# Patient Record
Sex: FEMALE | Race: WHITE | NOT HISPANIC OR LATINO | Employment: FULL TIME | URBAN - METROPOLITAN AREA
[De-identification: names, ages, dates, MRNs, and addresses within clinical notes are randomized per-mention and may not be internally consistent; named-entity substitution may affect disease eponyms.]

---

## 2017-01-11 ENCOUNTER — APPOINTMENT (OUTPATIENT)
Dept: WOMENS IMAGING | Facility: HOSPITAL | Age: 52
End: 2017-01-11

## 2017-01-11 ENCOUNTER — PROCEDURE VISIT (OUTPATIENT)
Dept: OBSTETRICS AND GYNECOLOGY | Facility: CLINIC | Age: 52
End: 2017-01-11

## 2017-01-11 DIAGNOSIS — Z12.31 VISIT FOR SCREENING MAMMOGRAM: Primary | ICD-10-CM

## 2017-01-11 PROCEDURE — 77067 SCR MAMMO BI INCL CAD: CPT | Performed by: OBSTETRICS & GYNECOLOGY

## 2017-01-11 PROCEDURE — 77067 SCR MAMMO BI INCL CAD: CPT | Performed by: RADIOLOGY

## 2017-12-14 ENCOUNTER — OFFICE VISIT (OUTPATIENT)
Dept: OBSTETRICS AND GYNECOLOGY | Facility: CLINIC | Age: 52
End: 2017-12-14

## 2017-12-14 VITALS
WEIGHT: 145 LBS | DIASTOLIC BLOOD PRESSURE: 66 MMHG | HEIGHT: 67 IN | BODY MASS INDEX: 22.76 KG/M2 | SYSTOLIC BLOOD PRESSURE: 101 MMHG | HEART RATE: 66 BPM

## 2017-12-14 DIAGNOSIS — Z01.419 WELL WOMAN EXAM WITH ROUTINE GYNECOLOGICAL EXAM: Primary | ICD-10-CM

## 2017-12-14 PROBLEM — Q89.2 THYROGLOSSAL DUCT CYST: Status: ACTIVE | Noted: 2017-04-12

## 2017-12-14 PROBLEM — C73 CANCER OF THYROID GLAND (HCC): Status: ACTIVE | Noted: 2017-04-12

## 2017-12-14 PROCEDURE — 99396 PREV VISIT EST AGE 40-64: CPT | Performed by: OBSTETRICS & GYNECOLOGY

## 2017-12-14 RX ORDER — VALACYCLOVIR HYDROCHLORIDE 500 MG/1
TABLET, FILM COATED ORAL
Refills: 0 | COMMUNITY
Start: 2017-11-20 | End: 2017-12-14 | Stop reason: SDUPTHER

## 2017-12-14 RX ORDER — VALACYCLOVIR HYDROCHLORIDE 500 MG/1
500 TABLET, FILM COATED ORAL DAILY
Qty: 30 TABLET | Refills: 11 | Status: SHIPPED | OUTPATIENT
Start: 2017-12-14 | End: 2019-06-18 | Stop reason: SDUPTHER

## 2017-12-14 RX ORDER — LEVOTHYROXINE SODIUM 125 MCG
TABLET ORAL
Refills: 2 | COMMUNITY
Start: 2017-11-20

## 2017-12-14 NOTE — PROGRESS NOTES
Subjective   Aneta Bhat is a 52 y.o. female  Patient here for annual. LAST PAP- 7/31/15.. LAST MAMMO- 17    History of Present Illness   Aneta Bhat is a 52 y.o.  who presents for an annual examination   Takes Valtrex - last outbreak 9 months ago   Enrolled in Ovarian cancer screening - ultrasound was in 2017 normal    Pap history:  Last pap:  - NIL, HR HPV + 16/18 Negative  Prior abnormal paps: yes, see above   DXA:  yes, by PCP  Colonoscopy:  yes, 3 years ago  Mammogram: 2017  STDs  Sexually active: no  History of STDs: no  Menopause:  Age: ablation -  - hot flashes now  Bleeding since? no  Vasomotor symptoms: yes, better with thyroid medicine  HRT: no - plans to start testosterone on Monday ([facility]) - sees Dr. Collins PCP for hormones  Incontinence?  no  Dyspareunia: no    History of physical abuse: no, History of sexual abuse: no and History of verbal/emotional abuse: no    BRCA screening:  Is patient's family or personal history significant for any of the following? yes, but declines genetic screening  For non-Ashkenazi [place] women:   Two first-degree relatives with breast cancer, one of whom was diagnosed at age 50 or younger   A combination of three or more first or second-degree relatives with breast cancer regardless of age at diagnosis   A combination of both breast and ovarian cancer among first and second-degree relatives   A first-degree relative with bilateral breast cancer   A combination of two or more first or second degree relatives with ovarian cancer, regardless of age at diagnosis   A first or second-degree relative with both breast and ovarian cancer at any age   History of breast cancer in a male relative   For women of Ashkenazi [place] descent:   Any first-degree relative (or two second degree relatives on the same side of the family) with breast or ovarian cancer   Recommendations from the [place] Preventive Services Task Force on who  should be offered genetic testing for BRCA mutations*     Past Medical History:   Diagnosis Date   • Arthritis    • Low back pain    • Menopause     TAKING VENLAFAXINE   • Neuropathy     FINGERTIPS   • PONV (postoperative nausea and vomiting)     WANTS SCOPOLAMINE PATCH   • Restless leg    • Thyroid cancer 2017   • Torn rotator cuff     RIGHT     Past Surgical History:   Procedure Laterality Date   • BLEPHAROPLASTY     • ENDOMETRIAL ABLATION  1999   • SHOULDER ARTHROSCOPY W/ ROTATOR CUFF REPAIR Right 11/17/2016    Procedure: RT SHOULDER ARTHROSCOPY WITH ACROMIOPLASTY AND ROTATOR CUFF REPAIR;  Surgeon: Mor Terry MD;  Location: [facility] OR [facility];  Service:      Social History   Substance Use Topics   • Smoking status: Never Smoker   • Smokeless tobacco: Never Used   • Alcohol use Yes      Comment: social     Family History   Problem Relation Age of Onset   • Uterine cancer Mother      70s   • Anemia Mother    • Hypertension Mother    • COPD Mother    • Depression Mother    • Osteoporosis Mother    • Colon cancer Father      80s   • Hypertension Father    • Osteoporosis Father    • Breast cancer Maternal Aunt      70s   • Breast cancer Paternal Aunt      40s   • Ovarian cancer Maternal Aunt      50s     Current Outpatient Prescriptions on File Prior to Visit   Medication Sig Dispense Refill   • [DISCONTINUED] Omega-3 Fatty Acids (FISH OIL) 1000 MG capsule capsule Take 2,000 mg by mouth Daily. PT TO HOLD MED PRIOR TO OR     • [DISCONTINUED] oxyCODONE-acetaminophen (PERCOCET) 5-325 MG per tablet Take 2 tablets by mouth Every 4 (Four) Hours As Needed (Pain). 40 tablet 0   • [DISCONTINUED] venlafaxine XR (EFFEXOR-XR) 37.5 MG 24 hr capsule Take 1 capsule by mouth daily. 30 capsule 11   • [DISCONTINUED] vitamin E 400 UNIT capsule Take 400 Units by mouth Daily. PT TO HOLD MED PRIOR TO OR       No current facility-administered medications on file prior to visit.      Allergies   Allergen Reactions   • Penicillins Hives,  "Swelling, Anaphylaxis and Shortness Of Breath        Review of Systems   Constitutional: Negative for chills, fever and unexpected weight change.   HENT: Negative for congestion, nosebleeds and sore throat.    Eyes: Negative for visual disturbance.   Respiratory: Negative for cough, chest tightness and shortness of breath.    Cardiovascular: Negative for chest pain and palpitations.   Gastrointestinal: Negative for abdominal pain, constipation, diarrhea, nausea and vomiting.   Endocrine: Negative for polyuria.   Genitourinary: Negative for difficulty urinating, dyspareunia, dysuria, frequency, genital sores, hematuria, menstrual problem, pelvic pain, urgency, vaginal bleeding, vaginal discharge and vaginal pain.   Musculoskeletal: Negative for arthralgias and joint swelling.   Skin: Negative for color change and rash.   Neurological: Negative for dizziness, light-headedness and headaches.   Hematological: Does not bruise/bleed easily.   Psychiatric/Behavioral: Negative for dysphoric mood. The patient is not nervous/anxious.        Objective    /66  Pulse 66  Ht 170.2 cm (67.01\")  Wt 65.8 kg (145 lb)  BMI 22.7 kg/m2   Physical Exam   Constitutional: She is oriented to person, place, and time. She appears well-developed and well-nourished. No distress.   HENT:   Head: Normocephalic and atraumatic.   Neck: No tracheal deviation present. No thyromegaly present.   Cardiovascular: Normal rate, regular rhythm and normal heart sounds.  Exam reveals no gallop and no friction rub.    No murmur heard.  Pulmonary/Chest: Effort normal and breath sounds normal. No respiratory distress. She has no wheezes. She has no rales. She exhibits no tenderness. Right breast exhibits no inverted nipple, no mass, no nipple discharge, no skin change and no tenderness. Left breast exhibits no inverted nipple, no mass, no nipple discharge, no skin change and no tenderness.   Abdominal: Soft. She exhibits no distension and no mass. " There is no tenderness.   Genitourinary: Uterus normal. No labial fusion. There is no rash, lesion or injury on the right labia. There is no rash, lesion or injury on the left labia. Uterus is not deviated, not enlarged, not fixed and not tender. Cervix exhibits no motion tenderness, no discharge and no friability. Right adnexum displays no mass, no tenderness and no fullness. Left adnexum displays no mass, no tenderness and no fullness. No tenderness or bleeding in the vagina. No vaginal discharge found.   Musculoskeletal: Normal range of motion. She exhibits no edema or deformity.   Lymphadenopathy:     She has no cervical adenopathy.   Neurological: She is alert and oriented to person, place, and time.   Skin: Skin is warm and dry. No rash noted. She is not diaphoretic.   Psychiatric: She has a normal mood and affect. Her behavior is normal. Judgment and thought content normal.         Assessment/Plan    Well woman counseling/screening:    Cervical cancer screening:    Reports h/o cervical dysplasia - HPV positive on most recent pap smear    The patient is due for a pap today with HPV testing.    Screening guidelines discussed with patient  Breast cancer screening:    Mammogram completed Jan 2017, plans to start doing with annual examination   Breast self awareness encouraged  Colonscopy:   UTD  DXA   Did with PCP  Family history    does demonstrate need for genetics referral given aunt with breast and ovarian cancer. Patient has a lot of health issues going on right now and would like to defer this but is understanding of the risks/benefits of BRCA and BRCA testing. She will contact us when she wishes for a genetic referral.  Healthy lifestyle counseling:   Body mass index is 22.7 kg/(m^2).     HRT: with PCP was referred to [facility]. Plans to follow up with them for initiation of testosterone.            Aneta was seen today for gynecologic exam.    Diagnoses and all orders for this  visit:    Well woman exam with routine gynecological exam  -     IGP, Aptima HPV, Rfx 16 / 18,45 - ThinPrep Vial, Cervix    Other orders  -     valACYclovir (VALTREX) 500 MG tablet; Take 1 tablet by mouth Daily.

## 2017-12-19 ENCOUNTER — TELEPHONE (OUTPATIENT)
Dept: OBSTETRICS AND GYNECOLOGY | Facility: CLINIC | Age: 52
End: 2017-12-19

## 2017-12-19 LAB
CYTOLOGIST CVX/VAG CYTO: NORMAL
CYTOLOGY CVX/VAG DOC THIN PREP: NORMAL
DX ICD CODE: NORMAL
HIV 1 & 2 AB SER-IMP: NORMAL
HPV I/H RISK 4 DNA CVX QL PROBE+SIG AMP: NEGATIVE
Lab: NORMAL
OTHER STN SPEC: NORMAL
PATH REPORT.FINAL DX SPEC: NORMAL
STAT OF ADQ CVX/VAG CYTO-IMP: NORMAL

## 2017-12-19 NOTE — TELEPHONE ENCOUNTER
----- Message from Maribel Anand MD sent at 12/19/2017  3:44 PM EST -----  Please call patient and let her know that her pap smear was normal and her HPV testing was negative.  For patients without a history of abnormal pap smears, we recommend repeat pap smears every five years and an annual exam every year unless otherwise indicated at the appointment or other issues arise.  Thanks!

## 2018-12-20 ENCOUNTER — TELEPHONE (OUTPATIENT)
Dept: OBSTETRICS AND GYNECOLOGY | Facility: CLINIC | Age: 53
End: 2018-12-20

## 2018-12-20 ENCOUNTER — OFFICE VISIT (OUTPATIENT)
Dept: OBSTETRICS AND GYNECOLOGY | Facility: CLINIC | Age: 53
End: 2018-12-20

## 2018-12-20 VITALS
HEART RATE: 64 BPM | BODY MASS INDEX: 24.17 KG/M2 | DIASTOLIC BLOOD PRESSURE: 74 MMHG | SYSTOLIC BLOOD PRESSURE: 118 MMHG | HEIGHT: 67 IN | WEIGHT: 154 LBS

## 2018-12-20 DIAGNOSIS — Z01.419 WELL WOMAN EXAM WITH ROUTINE GYNECOLOGICAL EXAM: Primary | ICD-10-CM

## 2018-12-20 PROCEDURE — 99396 PREV VISIT EST AGE 40-64: CPT | Performed by: OBSTETRICS & GYNECOLOGY

## 2018-12-20 RX ORDER — ESTRADIOL 0.04 MG/D
1 PATCH TRANSDERMAL WEEKLY
Qty: 4 PATCH | Refills: 3 | Status: SHIPPED | OUTPATIENT
Start: 2018-12-20 | End: 2019-03-18 | Stop reason: SDUPTHER

## 2018-12-20 NOTE — PROGRESS NOTES
Chief Complaint   Patient presents with   • Annual Exam     last pap: 17. NL no HPV detected. last mammo: 17. last colonoscopy: . DEXA:          Aneta Bhat is a 53 y.o.  who presents for an annual examination     Pap history:  Last pap: Dec 2017  NIL, HPV negative  Prior abnormal paps: yes, HPV positive 16/18negative - NIL in 201  DXA:  yes,  - reports was normal  Colonoscopy:  Yes  due in 10 years  Mammogram: due  STDs  Sexually active: yes  History of STDs: no  Menopause:  Age: 4 years ago started having hot flashes.  Had an ablation and stopped having periods.  Her VMS are bad enough where she is considering hormone replacement therapy. She tried testosterone compounding last year without relief.  Denies any h/o cholesterol disorders or abnormalities.  She denies any h/o stroke, VTE  Bleeding since? no  Vasomotor symptoms: yes  HRT: no  Dyspareunia: yes    Is patient's family or personal history significant for any risks for BRCA? yes, not interested in genetic testing at this time    Past Medical History:   Diagnosis Date   • Arthritis    • Low back pain    • Menopause     TAKING VENLAFAXINE   • Neuropathy     FINGERTIPS   • PONV (postoperative nausea and vomiting)     WANTS SCOPOLAMINE PATCH   • Restless leg    • Thyroid cancer (CMS/HCC) 2017   • Torn rotator cuff     RIGHT     Past Surgical History:   Procedure Laterality Date   • BLEPHAROPLASTY     • ENDOMETRIAL ABLATION     • SHOULDER ARTHROSCOPY W/ ROTATOR CUFF REPAIR Right 2016    Procedure: RT SHOULDER ARTHROSCOPY WITH ACROMIOPLASTY AND ROTATOR CUFF REPAIR;  Surgeon: Mor Terry MD;  Location: [facility] OR [facility] [place];  Service:      OB History    Para Term  AB Living   2 1 1   1 1   SAB TAB Ectopic Molar Multiple Live Births                    # Outcome Date GA Lbr [place]/2nd Weight Sex Delivery Anes PTL Lv   2 Term 93     Vag-Spont      1 AB                 Social History     Tobacco Use   •  Smoking status: Never Smoker   • Smokeless tobacco: Never Used   Substance Use Topics   • Alcohol use: Yes     Comment: social   • Drug use: No     Family History   Problem Relation Age of Onset   • Uterine cancer Mother         70s   • Anemia Mother    • Hypertension Mother    • COPD Mother    • Depression Mother    • Osteoporosis Mother    • Colon cancer Father         80s   • Hypertension Father    • Osteoporosis Father    • Breast cancer Maternal Aunt         70s   • Breast cancer Paternal Aunt         40s   • Ovarian cancer Maternal Aunt         50s     Current Outpatient Medications on File Prior to Visit   Medication Sig Dispense Refill   • LINACLOTIDE PO Take  by mouth As Needed.     • SYNTHROID 125 MCG tablet TAKE 1 TABLET BY MOUTH EVERY DAY  2   • valACYclovir (VALTREX) 500 MG tablet Take 1 tablet by mouth Daily. (Patient taking differently: Take 500 mg by mouth As Needed.) 30 tablet 11     No current facility-administered medications on file prior to visit.      Allergies   Allergen Reactions   • Penicillins Hives, Swelling, Anaphylaxis and Shortness Of Breath        Review of Systems   Constitutional: Negative for chills, fever and unexpected weight change.   HENT: Negative for congestion, nosebleeds and sore throat.    Eyes: Negative for visual disturbance.   Respiratory: Negative for cough, chest tightness and shortness of breath.    Cardiovascular: Negative for chest pain and palpitations.   Gastrointestinal: Negative for abdominal pain, constipation, diarrhea, nausea and vomiting.   Endocrine: Negative for polyuria.   Genitourinary: Negative for difficulty urinating, dyspareunia, dysuria, frequency, genital sores, hematuria, menstrual problem, pelvic pain, urgency, vaginal bleeding, vaginal discharge and vaginal pain.   Musculoskeletal: Negative for arthralgias and joint swelling.   Skin: Negative for color change and rash.   Neurological: Negative for dizziness, light-headedness and headaches.  "  Hematological: Does not bruise/bleed easily.   Psychiatric/Behavioral: Negative for dysphoric mood. The patient is not nervous/anxious.        OBJECTIVE:   Vitals:    12/20/18 1059   BP: 118/74   Pulse: 64   Weight: 69.9 kg (154 lb)   Height: 170.2 cm (67\")      Physical Exam   Constitutional: She is oriented to person, place, and time. She appears well-developed and well-nourished. No distress.   HENT:   Head: Normocephalic and atraumatic.   Eyes: EOM are normal. No scleral icterus.   Neck: Normal range of motion. Neck supple. No thyromegaly present.   Cardiovascular: Normal rate and regular rhythm. Exam reveals no gallop and no friction rub.   No murmur heard.  Pulmonary/Chest: Effort normal and breath sounds normal. No respiratory distress. She has no wheezes. She has no rales. She exhibits no tenderness. Right breast exhibits no inverted nipple. Left breast exhibits no inverted nipple. Breasts are symmetrical. There is no breast swelling.   Abdominal: Soft. Bowel sounds are normal. She exhibits no distension. There is no tenderness. There is no guarding.   Genitourinary: Vagina normal and uterus normal. There is no rash, tenderness, lesion, injury or Bartholin's cyst on the right labia. There is no rash, tenderness, lesion, injury or Bartholin's cyst on the left labia. Uterus is not mobile.   Cervix is not parous. Cervix does not exhibit motion tenderness, discharge, friability, lesion, polyp, nabothian cyst, eversion, pinkness or cyanosis. Right adnexum displays no mass, no tenderness and no fullness. Right adnexum is present.Left adnexum displays no mass, no tenderness and no fullness. Left adnexum is present.No vaginal discharge found.   Musculoskeletal: She exhibits no edema or deformity.   Neurological: She is alert and oriented to person, place, and time.   Skin: Skin is warm and dry. She is not diaphoretic.   Psychiatric: She has a normal mood and affect. Her speech is normal and behavior is normal. " Judgment and thought content normal. Cognition and memory are normal.       ASSESSMENT/PLAN:  Well woman counseling/screening:    Cervical cancer screening:    Reports some h/o cervical dysplasia   The patient is due for a pap 2020.    Screening guidelines discussed with patient  Breast cancer screening:    Mammogram scheduled   Breast self awareness encouraged  Colonscopy:   Due in 2024  DXA   2004, recommended calcium and vitamin d, weight bearing exercise  Family history  Reviewed criteria for genetic referral  Healthy lifestyle counseling:   return for routine annual checkups   Patient was counseled on the risks and benefits of HRT  Specifically, we reviewed the FDA indications for HRT in postmenopausal women for the treatment of vasomotor symptoms.  The appropriate, often lowest, effective dose of systemic ET consistent with treatment goals that provides benefits and minimizes risks for the individual woman should be the therapeutic goal. In women who have a uterus, they should take progestin to prevent development of endometrial hyperplasia/malignancy. She is understanding that HRT can be associated with some risks including but not limited to nausea, bloating, mood swings, bleeding, headaches, breast tenderness as well as more serious side effects such as increased risk of CVD, stroke, hypertension and rare risk of breast cancer.  Relative contraindications do exist for hormone therapy and include unexplained vaginal bleeding, severe active liver disease, prior estrogen-sensitive breast or endometrial cancer, coronary heart disease (CHD), stroke, dementia, personal history or inherited high risk of thromboembolic disease, porphyria cutanea tarda, and hypertriglyc-eridemia.  There are also concerns that in women with a history of endometriosis, HT could result in a reactivation.  Also, migraine headaches may worsen, or leiomyomas may grow.  In a model of joint decision making, the patient has decided to  proceed with low dose estrogen patch and progestin pills after discussing with her endocrinologist.  If she were to have unexplained vaginal bleeding, swelling in leg, chest pain, or other concerning finding, she should stop the HT and seek immediate medical care.    She was counseled that we may have to adjust the dosing to treat the symptoms with an ultimate plan to taper as tolerated.  All questions were answered to apparent satisfaction and she will call back after discussion with her endocrinologist.

## 2018-12-20 NOTE — PATIENT INSTRUCTIONS
We were considering starting a low dose estrogen patch and progestin (oral) therapy for vasomotor symptoms of menopause.  Please discuss with endocrinologist

## 2018-12-20 NOTE — TELEPHONE ENCOUNTER
Pt called after speaking with her endocrinologist and she said he was in agreement with the hormone patch and the pill. Pt is asking for this to be sent to her pharmacy. Please advise.       Pt callback: [phone]

## 2018-12-21 ENCOUNTER — PROCEDURE VISIT (OUTPATIENT)
Dept: OBSTETRICS AND GYNECOLOGY | Facility: CLINIC | Age: 53
End: 2018-12-21

## 2018-12-21 ENCOUNTER — APPOINTMENT (OUTPATIENT)
Dept: WOMENS IMAGING | Facility: HOSPITAL | Age: 53
End: 2018-12-21

## 2018-12-21 DIAGNOSIS — Z12.31 VISIT FOR SCREENING MAMMOGRAM: Primary | ICD-10-CM

## 2018-12-21 PROCEDURE — 77067 SCR MAMMO BI INCL CAD: CPT | Performed by: OBSTETRICS & GYNECOLOGY

## 2018-12-21 PROCEDURE — 77067 SCR MAMMO BI INCL CAD: CPT | Performed by: RADIOLOGY

## 2018-12-26 ENCOUNTER — TELEPHONE (OUTPATIENT)
Dept: OBSTETRICS AND GYNECOLOGY | Facility: CLINIC | Age: 53
End: 2018-12-26

## 2018-12-26 NOTE — TELEPHONE ENCOUNTER
"Please let patient know that mammogram was normal this year.  It is recommended that we inform all patients of their breast density; her breast density is \"extremely dense\" which lowers the sensitivity of mammogram.  A good reference to explain this further is: https://www.cancer.gov/types/breast/breast-changes/dense-breasts.     "

## 2019-03-18 RX ORDER — ESTRADIOL 0.04 MG/D
1 PATCH TRANSDERMAL WEEKLY
Qty: 12 PATCH | Refills: 2 | Status: SHIPPED | OUTPATIENT
Start: 2019-03-18 | End: 2019-06-20 | Stop reason: SDUPTHER

## 2019-06-18 RX ORDER — VALACYCLOVIR HYDROCHLORIDE 500 MG/1
500 TABLET, FILM COATED ORAL DAILY
Qty: 30 TABLET | Refills: 1 | Status: SHIPPED | OUTPATIENT
Start: 2019-06-18 | End: 2019-08-30 | Stop reason: SDUPTHER

## 2019-06-20 ENCOUNTER — OFFICE VISIT (OUTPATIENT)
Dept: OBSTETRICS AND GYNECOLOGY | Facility: CLINIC | Age: 54
End: 2019-06-20

## 2019-06-20 VITALS
WEIGHT: 147 LBS | DIASTOLIC BLOOD PRESSURE: 76 MMHG | HEIGHT: 67 IN | BODY MASS INDEX: 23.07 KG/M2 | HEART RATE: 69 BPM | SYSTOLIC BLOOD PRESSURE: 114 MMHG

## 2019-06-20 DIAGNOSIS — R35.0 URINARY FREQUENCY: Primary | ICD-10-CM

## 2019-06-20 DIAGNOSIS — N95.1 MENOPAUSAL VASOMOTOR SYNDROME: ICD-10-CM

## 2019-06-20 LAB
BILIRUB BLD-MCNC: NEGATIVE MG/DL
CLARITY, POC: CLEAR
COLOR UR: YELLOW
GLUCOSE UR STRIP-MCNC: NEGATIVE MG/DL
KETONES UR QL: ABNORMAL
LEUKOCYTE EST, POC: NEGATIVE
NITRITE UR-MCNC: NEGATIVE MG/ML
PH UR: 6 [PH] (ref 5–8)
PROT UR STRIP-MCNC: NEGATIVE MG/DL
RBC # UR STRIP: NEGATIVE /UL
SP GR UR: 1.02 (ref 1–1.03)
UROBILINOGEN UR QL: NORMAL

## 2019-06-20 PROCEDURE — 99213 OFFICE O/P EST LOW 20 MIN: CPT | Performed by: OBSTETRICS & GYNECOLOGY

## 2019-06-20 PROCEDURE — 81002 URINALYSIS NONAUTO W/O SCOPE: CPT | Performed by: OBSTETRICS & GYNECOLOGY

## 2019-06-20 RX ORDER — ESTRADIOL 0.04 MG/D
1 PATCH TRANSDERMAL WEEKLY
Qty: 12 PATCH | Refills: 4 | Status: SHIPPED | OUTPATIENT
Start: 2019-06-20 | End: 2020-07-31 | Stop reason: SDUPTHER

## 2019-06-20 NOTE — PROGRESS NOTES
SUBJECTIVE:   Chief Complaint   Patient presents with   • hormones     pt is following from hormone patch. Pt also reports the urgency of urinating and does not urinate.         Aneta Bhat is a 54 y.o.  who presents for follow up of HRT.  Reports hot flashes are much improved.  She is also sleeping well.  She has not had any vaginal bleeding.  She is taking the estrogen patch and progestin by mouth.  She does report urinary frequency with small amounts of urination.  Upon completion she does feel that her bladder is completely empty.  She has not had any fever or chills.  She has had symptoms like this before during a urinary tract infection.  This started about a month ago and feels that it has overall improved by increasing fluid intake and eating yogurt.  Denies any vaginal discharge or irritation    Past Medical History:   Diagnosis Date   • Arthritis    • Low back pain    • Menopause     TAKING VENLAFAXINE   • Neuropathy     FINGERTIPS   • PONV (postoperative nausea and vomiting)     WANTS SCOPOLAMINE PATCH   • Restless leg    • Thyroid cancer (CMS/[facility]) 2017   • Torn rotator cuff     RIGHT     Past Surgical History:   Procedure Laterality Date   • BLEPHAROPLASTY     • ENDOMETRIAL ABLATION     • SHOULDER ARTHROSCOPY W/ ROTATOR CUFF REPAIR Right 2016    Procedure: RT SHOULDER ARTHROSCOPY WITH ACROMIOPLASTY AND ROTATOR CUFF REPAIR;  Surgeon: Mor Terry MD;  Location: [facility] OR [facility] [place];  Service:      OB History    Para Term  AB Living   2 1 1   1 1   SAB TAB Ectopic Molar Multiple Live Births                    # Outcome Date GA Lbr [place]/2nd Weight Sex Delivery Anes PTL Lv   2 Term 93     Vag-Spont      1 AB                  Social History     Tobacco Use   • Smoking status: Never Smoker   • Smokeless tobacco: Never Used   Substance Use Topics   • Alcohol use: Yes     Comment: social   • Drug use: No     Family History   Problem Relation Age of Onset   • Uterine cancer  "Mother         70s   • Anemia Mother    • Hypertension Mother    • COPD Mother    • Depression Mother    • Osteoporosis Mother    • Colon cancer Father         80s   • Hypertension Father    • Osteoporosis Father    • Breast cancer Maternal Aunt         70s   • Breast cancer Paternal Aunt         40s   • Ovarian cancer Maternal Aunt         50s     Current Outpatient Medications on File Prior to Visit   Medication Sig Dispense Refill   • LINACLOTIDE PO Take  by mouth As Needed.     • SYNTHROID 125 MCG tablet TAKE 1 TABLET BY MOUTH EVERY DAY  2   • valACYclovir (VALTREX) 500 MG tablet Take 1 tablet by mouth Daily. 30 tablet 1   • [DISCONTINUED] estradiol (CLIMARA) 0.0375 MG/24HR Place 1 patch on the skin as directed by provider 1 (One) Time Per Week. 12 patch 2   • [DISCONTINUED] progesterone (PROMETRIUM) 100 MG capsule Take 1 capsule by mouth Daily. 90 capsule 2     No current facility-administered medications on file prior to visit.      Allergies   Allergen Reactions   • Penicillins Hives, Swelling, Anaphylaxis and Shortness Of Breath        Review of Systems   Constitutional: Negative for chills and fever.   Respiratory: Negative for shortness of breath.    Cardiovascular: Negative for chest pain.   Gastrointestinal: Negative for abdominal pain.   Genitourinary: Positive for frequency. Negative for difficulty urinating, pelvic pain and vaginal bleeding.   Musculoskeletal: Negative for myalgias.   Neurological: Negative for dizziness and light-headedness.         OBJECTIVE:   Vitals:    06/20/19 1518   BP: 114/76   Pulse: 69   Weight: 66.7 kg (147 lb)   Height: 170.2 cm (67.01\")      Physical Exam   Constitutional: She is oriented to person, place, and time. She appears well-developed and well-nourished. No distress.   HENT:   Head: Normocephalic and atraumatic.   Eyes: EOM are normal. No scleral icterus.   Neck: Normal range of motion.   Cardiovascular: Normal rate and regular rhythm.   Pulmonary/Chest: Effort " normal. No respiratory distress.   Abdominal: Soft. She exhibits no distension.   Musculoskeletal: Normal range of motion.   Neurological: She is alert and oriented to person, place, and time.   Skin: Skin is warm and dry. No rash noted. She is not diaphoretic. No erythema.   Psychiatric: She has a normal mood and affect. Her behavior is normal. Judgment and thought content normal.       ASSESSMENT/PLAN:     ICD-10-CM ICD-9-CM   1. Urinary frequency R35.0 788.41   2. Menopausal vasomotor syndrome N95.1 627.2       UA within normal limits. If urinary symptoms do not improve, recommend further work up.  Would like to continue HRT.  12 months sent.  Annual due in December.   Follow up with problems or for annual exam    Return in about 6 months (around 12/20/2019) for Annual physical.

## 2019-08-30 RX ORDER — VALACYCLOVIR HYDROCHLORIDE 500 MG/1
TABLET, FILM COATED ORAL
Qty: 30 TABLET | Refills: 10 | Status: SHIPPED | OUTPATIENT
Start: 2019-08-30 | End: 2020-01-03 | Stop reason: SDUPTHER

## 2019-12-27 ENCOUNTER — APPOINTMENT (OUTPATIENT)
Dept: WOMENS IMAGING | Facility: HOSPITAL | Age: 54
End: 2019-12-27

## 2019-12-27 ENCOUNTER — PROCEDURE VISIT (OUTPATIENT)
Dept: OBSTETRICS AND GYNECOLOGY | Facility: CLINIC | Age: 54
End: 2019-12-27

## 2019-12-27 DIAGNOSIS — Z12.31 VISIT FOR SCREENING MAMMOGRAM: Primary | ICD-10-CM

## 2019-12-27 PROCEDURE — 77067 SCR MAMMO BI INCL CAD: CPT | Performed by: OBSTETRICS & GYNECOLOGY

## 2019-12-27 PROCEDURE — 77063 BREAST TOMOSYNTHESIS BI: CPT | Performed by: OBSTETRICS & GYNECOLOGY

## 2019-12-27 PROCEDURE — 77067 SCR MAMMO BI INCL CAD: CPT | Performed by: RADIOLOGY

## 2019-12-27 PROCEDURE — 77063 BREAST TOMOSYNTHESIS BI: CPT | Performed by: RADIOLOGY

## 2020-01-03 ENCOUNTER — TELEPHONE (OUTPATIENT)
Dept: OBSTETRICS AND GYNECOLOGY | Facility: CLINIC | Age: 55
End: 2020-01-03

## 2020-01-03 RX ORDER — VALACYCLOVIR HYDROCHLORIDE 500 MG/1
500 TABLET, FILM COATED ORAL DAILY
Qty: 90 TABLET | Refills: 2 | Status: SHIPPED | OUTPATIENT
Start: 2020-01-03 | End: 2020-12-31

## 2020-01-03 NOTE — TELEPHONE ENCOUNTER
Rx provided for 6 months. Was seen in June for problem visit but no annual since Dec 2018.  Should schedule. Thanks!

## 2020-01-20 ENCOUNTER — TELEPHONE (OUTPATIENT)
Dept: OBSTETRICS AND GYNECOLOGY | Facility: CLINIC | Age: 55
End: 2020-01-20

## 2020-01-20 NOTE — TELEPHONE ENCOUNTER
----- Message from Maribel Anand MD sent at 12/31/2019  1:15 PM EST -----  Please let patient know she had a benign mammogram. She has extremely dense breasts which may limit sensitivity of mammogram.  Follow up in one year.    (based on media tab) Letter with mammogram results was mailed by mammogram office on 12/30/19

## 2020-07-31 ENCOUNTER — OFFICE VISIT (OUTPATIENT)
Dept: OBSTETRICS AND GYNECOLOGY | Facility: CLINIC | Age: 55
End: 2020-07-31

## 2020-07-31 VITALS
WEIGHT: 148 LBS | HEART RATE: 57 BPM | HEIGHT: 67 IN | SYSTOLIC BLOOD PRESSURE: 114 MMHG | BODY MASS INDEX: 23.23 KG/M2 | DIASTOLIC BLOOD PRESSURE: 69 MMHG

## 2020-07-31 DIAGNOSIS — Z01.419 WELL WOMAN EXAM WITH ROUTINE GYNECOLOGICAL EXAM: Primary | ICD-10-CM

## 2020-07-31 PROCEDURE — 99396 PREV VISIT EST AGE 40-64: CPT | Performed by: OBSTETRICS & GYNECOLOGY

## 2020-07-31 RX ORDER — ESTRADIOL 0.04 MG/D
1 PATCH TRANSDERMAL WEEKLY
Qty: 12 PATCH | Refills: 4 | Status: SHIPPED | OUTPATIENT
Start: 2020-07-31 | End: 2021-03-26 | Stop reason: SDUPTHER

## 2020-07-31 RX ORDER — LANOLIN ALCOHOL/MO/W.PET/CERES
1000 CREAM (GRAM) TOPICAL DAILY
COMMUNITY

## 2020-07-31 NOTE — PROGRESS NOTES
Chief Complaint   Patient presents with   • Annual Exam     pap:  NILM -HPV, mammo: , colonoscopy:         Aneta Bhat is a 55 y.o.  who presents for an annual examination     Pap history:  Last pap: Dec 2017  NIL, HPV negative  Prior abnormal paps: yes, HPV positive 16/18negative - NIL in   DXA:  yes, 2004 - reports was normal  Colonoscopy:  Yes  due in 10 years  Mammogram: Dec 2019  STDs  Sexually active: yes  History of STDs: no  Menopause:  Age: 4 years ago started having hot flashes.  Had an ablation and stopped having periods.  Her VMS are bad enough where she is considering hormone replacement therapy. She tried testosterone compounding last year without relief.  Denies any h/o cholesterol disorders or abnormalities.  She denies any h/o stroke, VTE  Bleeding since? no  Vasomotor symptoms: yes  HRT: no  Dyspareunia: yes    Is patient's family or personal history significant for any risks for BRCA? yes, not interested in genetic testing at this time    Past Medical History:   Diagnosis Date   • Arthritis    • Low back pain    • Menopause     TAKING VENLAFAXINE   • Neuropathy     FINGERTIPS   • PONV (postoperative nausea and vomiting)     WANTS SCOPOLAMINE PATCH   • Restless leg    • Thyroid cancer (CMS/HCC) 2017   • Torn rotator cuff     RIGHT     Past Surgical History:   Procedure Laterality Date   • BLEPHAROPLASTY     • ENDOMETRIAL ABLATION     • SHOULDER ARTHROSCOPY W/ ROTATOR CUFF REPAIR Right 2016    Procedure: RT SHOULDER ARTHROSCOPY WITH ACROMIOPLASTY AND ROTATOR CUFF REPAIR;  Surgeon: Mor Terry MD;  Location: [facility] OR [facility] [place];  Service:      OB History    Para Term  AB Living   2 1 1   1 1   SAB TAB Ectopic Molar Multiple Live Births             1      # Outcome Date GA Lbr [place]/2nd Weight Sex Delivery Anes PTL Lv   2 Term 93     Vag-Spont   GORDON   1 AB              Social History     Tobacco Use   • Smoking status: Never Smoker   • Smokeless  tobacco: Never Used   Substance Use Topics   • Alcohol use: Yes     Comment: social   • Drug use: No     Family History   Problem Relation Age of Onset   • Uterine cancer Mother         70s   • Anemia Mother    • Hypertension Mother    • COPD Mother    • Depression Mother    • Osteoporosis Mother    • Colon cancer Father         80s   • Hypertension Father    • Osteoporosis Father    • Breast cancer Maternal Aunt         70s   • Breast cancer Paternal Aunt         40s   • Ovarian cancer Maternal Aunt         50s   • Deep vein thrombosis Neg Hx    • Pulmonary embolism Neg Hx      Current Outpatient Medications on File Prior to Visit   Medication Sig Dispense Refill   • SYNTHROID 125 MCG tablet TAKE 1 TABLET BY MOUTH EVERY DAY  2   • valACYclovir (VALTREX) 500 MG tablet Take 1 tablet by mouth Daily. 90 tablet 2   • vitamin B-12 (CYANOCOBALAMIN) 1000 MCG tablet Take 1,000 mcg by mouth Daily.     • [DISCONTINUED] estradiol (CLIMARA) 0.0375 MG/24HR Place 1 patch on the skin as directed by provider 1 (One) Time Per Week. 12 patch 4   • [DISCONTINUED] progesterone (PROMETRIUM) 100 MG capsule Take 1 capsule by mouth Daily. 90 capsule 4   • [DISCONTINUED] LINACLOTIDE PO Take  by mouth As Needed.       No current facility-administered medications on file prior to visit.      Allergies   Allergen Reactions   • Penicillins Hives, Swelling, Anaphylaxis and Shortness Of Breath        Review of Systems   Constitutional: Negative for chills, fever and unexpected weight change.   HENT: Negative for congestion, nosebleeds and sore throat.    Eyes: Negative for visual disturbance.   Respiratory: Negative for cough, chest tightness and shortness of breath.    Cardiovascular: Negative for chest pain and palpitations.   Gastrointestinal: Positive for constipation. Negative for abdominal distention, abdominal pain, blood in stool, diarrhea, nausea and vomiting.        Denies early satiety   Endocrine: Negative for polyuria.  "  Genitourinary: Positive for pelvic pain (left sided pelvic pain, worse with intercourse). Negative for difficulty urinating, dyspareunia, dysuria, frequency, genital sores, hematuria, menstrual problem, urgency, vaginal bleeding, vaginal discharge and vaginal pain.   Musculoskeletal: Negative for arthralgias and joint swelling.   Skin: Negative for color change and rash.   Neurological: Negative for dizziness, light-headedness and headaches.   Hematological: Does not bruise/bleed easily.   Psychiatric/Behavioral: Negative for dysphoric mood. The patient is not nervous/anxious.        OBJECTIVE:   Vitals:    07/31/20 0944   BP: 114/69   Pulse: 57   Weight: 67.1 kg (148 lb)   Height: 170.2 cm (67\")      Physical Exam   Constitutional: She is oriented to person, place, and time. She appears well-developed and well-nourished. No distress.   HENT:   Head: Normocephalic and atraumatic.   Eyes: EOM are normal. No scleral icterus.   Neck: Normal range of motion. Neck supple. No thyromegaly present.   Cardiovascular: Normal rate and regular rhythm. Exam reveals no gallop and no friction rub.   No murmur heard.  Pulmonary/Chest: Effort normal and breath sounds normal. No respiratory distress. She has no wheezes. She has no rales. She exhibits no tenderness. Right breast exhibits no inverted nipple. Left breast exhibits no inverted nipple. No breast swelling, tenderness, discharge or bleeding. Breasts are symmetrical.   Abdominal: Soft. Bowel sounds are normal. She exhibits no distension. There is no tenderness. There is no guarding.   Genitourinary: Vagina normal and uterus normal. There is no rash, tenderness, lesion, injury or Bartholin's cyst on the right labia. There is no rash, tenderness, lesion, injury or Bartholin's cyst on the left labia. Uterus is not mobile.   Cervix is not parous. Cervix does not exhibit motion tenderness, discharge, friability, lesion, polyp, nabothian cyst, eversion, pinkness or cyanosis. " Right adnexum displays no mass, no tenderness and no fullness. Right adnexum is present.Left adnexum displays no mass, no tenderness and no fullness. Left adnexum is present.No vaginal discharge found.   Musculoskeletal: She exhibits no edema or deformity.   Neurological: She is alert and oriented to person, place, and time.   Skin: Skin is warm and dry. She is not diaphoretic.   Psychiatric: She has a normal mood and affect. Her speech is normal and behavior is normal. Judgment and thought content normal. Cognition and memory are normal.       ASSESSMENT/PLAN:  Well woman counseling/screening:    Cervical cancer screening:    Reports some h/o cervical dysplasia   The patient is due for a pap today.    Screening guidelines discussed with patient  Breast cancer screening:    Mammogram due Dec 2020   Breast self awareness encouraged  Colonscopy:   Due in 2024 DXA 2004, recommended calcium and vitamin d, weight bearing exercise  Family history  Reviewed criteria for genetic referral  Healthy lifestyle counseling:   return for routine annual checkups   Patient was counseled on the risks and benefits of HRT  Specifically, we reviewed the FDA indications for HRT in postmenopausal women for the treatment of vasomotor symptoms.  The appropriate, often lowest, effective dose of systemic ET consistent with treatment goals that provides benefits and minimizes risks for the individual woman should be the therapeutic goal. In women who have a uterus, they should take progestin to prevent development of endometrial hyperplasia/malignancy. She is understanding that HRT can be associated with some risks including but not limited to nausea, bloating, mood swings, bleeding, headaches, breast tenderness as well as more serious side effects such as increased risk of CVD, stroke, hypertension and rare risk of breast cancer.  Relative contraindications do exist for hormone therapy and include unexplained vaginal bleeding, severe  active liver disease, prior estrogen-sensitive breast or endometrial cancer, coronary heart disease (CHD), stroke, dementia, personal history or inherited high risk of thromboembolic disease, porphyria cutanea tarda, and hypertriglyc-eridemia.  There are also concerns that in women with a history of endometriosis, HT could result in a reactivation.  Also, migraine headaches may worsen, or leiomyomas may grow.  In a model of joint decision making, the patient has decided to continue with low dose estrogen patch and progestin pills after discussing with her endocrinologist.  If she were to have unexplained vaginal bleeding, swelling in leg, chest pain, or other concerning finding, she should stop the HT and seek immediate medical care.    She was counseled that we may have to adjust the dosing to treat the symptoms with an ultimate plan to taper as tolerated.  All questions were answered to apparent satisfaction and she will call back after discussion with her endocrinologist.    Right sided abdominal pain: pelvic exam within normal limits however we reviewed limitations of pelvic exam in detecting anomalies. She has an US scheduled in Sept at . If symptoms worsen or US gets pushed back, recommend calling us for US and she agrees.

## 2020-08-05 LAB
CYTOLOGIST CVX/VAG CYTO: NORMAL
CYTOLOGY CVX/VAG DOC CYTO: NORMAL
CYTOLOGY CVX/VAG DOC THIN PREP: NORMAL
DX ICD CODE: NORMAL
HIV 1 & 2 AB SER-IMP: NORMAL
HPV I/H RISK 4 DNA CVX QL PROBE+SIG AMP: NEGATIVE
OTHER STN SPEC: NORMAL
STAT OF ADQ CVX/VAG CYTO-IMP: NORMAL

## 2020-12-28 ENCOUNTER — APPOINTMENT (OUTPATIENT)
Dept: WOMENS IMAGING | Facility: HOSPITAL | Age: 55
End: 2020-12-28

## 2020-12-28 ENCOUNTER — PROCEDURE VISIT (OUTPATIENT)
Dept: OBSTETRICS AND GYNECOLOGY | Facility: CLINIC | Age: 55
End: 2020-12-28

## 2020-12-28 DIAGNOSIS — Z12.31 VISIT FOR SCREENING MAMMOGRAM: Primary | ICD-10-CM

## 2020-12-28 PROCEDURE — 77063 BREAST TOMOSYNTHESIS BI: CPT | Performed by: RADIOLOGY

## 2020-12-28 PROCEDURE — 77063 BREAST TOMOSYNTHESIS BI: CPT | Performed by: OBSTETRICS & GYNECOLOGY

## 2020-12-28 PROCEDURE — 77067 SCR MAMMO BI INCL CAD: CPT | Performed by: RADIOLOGY

## 2020-12-28 PROCEDURE — 77067 SCR MAMMO BI INCL CAD: CPT | Performed by: OBSTETRICS & GYNECOLOGY

## 2020-12-31 RX ORDER — VALACYCLOVIR HYDROCHLORIDE 500 MG/1
TABLET, FILM COATED ORAL
Qty: 90 TABLET | Refills: 2 | Status: SHIPPED | OUTPATIENT
Start: 2020-12-31 | End: 2022-02-14

## 2021-03-26 ENCOUNTER — TELEPHONE (OUTPATIENT)
Dept: OBSTETRICS AND GYNECOLOGY | Facility: CLINIC | Age: 56
End: 2021-03-26

## 2021-03-26 RX ORDER — ESTRADIOL 0.04 MG/D
1 PATCH TRANSDERMAL WEEKLY
Qty: 12 PATCH | Refills: 1 | Status: SHIPPED | OUTPATIENT
Start: 2021-03-26 | End: 2021-08-09 | Stop reason: SDUPTHER

## 2021-03-26 NOTE — TELEPHONE ENCOUNTER
Refill of Climara and Progesterone sent (must be on both).  She is due for an annual in July 2021.  Please encourage her to schedule. Thanks!

## 2021-03-26 NOTE — TELEPHONE ENCOUNTER
Good morning,   Patient is calling to see if provider could call in refill of RX estradiol (Climara) 0.0375 MG/24HR   If possible.    Please advise,  Thank you      Pharmacy Confirmed - [id number] - [place], [place] - [address] [address] AT [facility] - [phone] [facility] [phone] FX

## 2021-08-09 ENCOUNTER — OFFICE VISIT (OUTPATIENT)
Dept: OBSTETRICS AND GYNECOLOGY | Facility: CLINIC | Age: 56
End: 2021-08-09

## 2021-08-09 VITALS
HEART RATE: 69 BPM | HEIGHT: 67 IN | SYSTOLIC BLOOD PRESSURE: 127 MMHG | DIASTOLIC BLOOD PRESSURE: 78 MMHG | BODY MASS INDEX: 22.88 KG/M2 | WEIGHT: 145.8 LBS

## 2021-08-09 DIAGNOSIS — Z01.419 WELL WOMAN EXAM WITH ROUTINE GYNECOLOGICAL EXAM: Primary | ICD-10-CM

## 2021-08-09 PROBLEM — E04.1 THYROID NODULE: Status: ACTIVE | Noted: 2017-04-13

## 2021-08-09 PROBLEM — M19.90 ARTHRITIS: Status: ACTIVE | Noted: 2021-08-09

## 2021-08-09 PROBLEM — Z90.89 HISTORY OF THYROIDECTOMY: Status: ACTIVE | Noted: 2017-11-30

## 2021-08-09 PROBLEM — Z98.890 HISTORY OF THYROIDECTOMY: Status: ACTIVE | Noted: 2017-11-30

## 2021-08-09 PROBLEM — E89.0 HISTORY OF THYROIDECTOMY: Status: ACTIVE | Noted: 2017-11-30

## 2021-08-09 PROBLEM — G54.5 NEURALGIC AMYOTROPHY: Status: ACTIVE | Noted: 2021-08-09

## 2021-08-09 PROCEDURE — 99396 PREV VISIT EST AGE 40-64: CPT | Performed by: OBSTETRICS & GYNECOLOGY

## 2021-08-09 RX ORDER — DOXYCYCLINE HYCLATE 50 MG/1
324 CAPSULE, GELATIN COATED ORAL DAILY
COMMUNITY
Start: 2021-07-07 | End: 2022-07-07

## 2021-08-09 RX ORDER — PROGESTERONE 100 MG/1
100 CAPSULE ORAL DAILY
Qty: 90 CAPSULE | Refills: 4 | Status: SHIPPED | OUTPATIENT
Start: 2021-08-09 | End: 2023-01-12

## 2021-08-09 RX ORDER — ESTRADIOL 0.04 MG/D
1 PATCH TRANSDERMAL WEEKLY
Qty: 12 PATCH | Refills: 4 | Status: SHIPPED | OUTPATIENT
Start: 2021-08-09 | End: 2022-09-06

## 2021-08-09 NOTE — PROGRESS NOTES
Chief Complaint   Patient presents with   • Gynecologic Exam     AE: last pap ; last mx01; last colonoscopy         Aneta Bhat is a 56 y.o.  who presents for an annual examination     Pap history:  Last pap: 2020 NIL, HPV negative  Prior abnormal paps:  yes, HPV positive 16/18negative - NIL in  -  was normal  DXA:  yes, 2004 - reports was normal - repeated this AM  Colonoscopy:  Yes , plan for repeat this year (Scheduled in Dec 2020)  Mammogram: Dec 2020   STDs  Sexually active: yes  History of STDs: no  Menopause:  Age: 4 years ago started having hot flashes.  Had an ablation and stopped having periods.  Her VMS are well controlled with the hormone patches and progestin  Bleeding since? no  Vasomotor symptoms: yes  HRT: no  Dyspareunia: yes    Is patient's family or personal history significant for any risks for BRCA? yes, not interested in genetic testing at this time    Past Medical History:   Diagnosis Date   • Arthritis    • Low back pain    • Menopause     TAKING VENLAFAXINE   • Neuropathy     FINGERTIPS   • PONV (postoperative nausea and vomiting)     WANTS SCOPOLAMINE PATCH   • Restless leg    • Thyroid cancer (CMS/HCC) 2017   • Torn rotator cuff     RIGHT     Past Surgical History:   Procedure Laterality Date   • BLEPHAROPLASTY     • ENDOMETRIAL ABLATION     • SHOULDER ARTHROSCOPY W/ ROTATOR CUFF REPAIR Right 2016    Procedure: RT SHOULDER ARTHROSCOPY WITH ACROMIOPLASTY AND ROTATOR CUFF REPAIR;  Surgeon: Mor Terry MD;  Location: [facility] OR [facility] [place];  Service:      OB History    Para Term  AB Living   2 1 1   1 1   SAB TAB Ectopic Molar Multiple Live Births             1      # Outcome Date GA Lbr [place]/2nd Weight Sex Delivery Anes PTL Lv   2 Term 93     Vag-Spont   GORDON   1 AB              Social History     Tobacco Use   • Smoking status: Never Smoker   • Smokeless tobacco: Never Used   Substance Use Topics   • Alcohol use: Yes     Comment:  social   • Drug use: No     Family History   Problem Relation Age of Onset   • Uterine cancer Mother         70s   • Anemia Mother    • Hypertension Mother    • COPD Mother    • Depression Mother    • Osteoporosis Mother    • Colon cancer Father         80s   • Hypertension Father    • Osteoporosis Father    • Breast cancer Maternal Aunt         70s   • Breast cancer Paternal Aunt         40s   • Ovarian cancer Maternal Aunt         50s   • Deep vein thrombosis Neg Hx    • Pulmonary embolism Neg Hx      Current Outpatient Medications on File Prior to Visit   Medication Sig Dispense Refill   • estradiol (Climara) 0.0375 MG/24HR Place 1 patch on the skin as directed by provider 1 (One) Time Per Week. 12 patch 1   • ferrous gluconate (FERGON) 324 MG tablet Take 324 mg by mouth Daily.     • progesterone (Prometrium) 100 MG capsule Take 1 capsule by mouth Daily. 90 capsule 1   • SYNTHROID 125 MCG tablet TAKE 1 TABLET BY MOUTH EVERY DAY  2   • valACYclovir (VALTREX) 500 MG tablet TAKE 1 TABLET BY MOUTH EVERY DAY 90 tablet 2   • vitamin B-12 (CYANOCOBALAMIN) 1000 MCG tablet Take 1,000 mcg by mouth Daily.       No current facility-administered medications on file prior to visit.     Allergies   Allergen Reactions   • Penicillins Hives, Swelling, Anaphylaxis and Shortness Of Breath        Review of Systems   Constitutional: Negative for chills, fever and unexpected weight change.   HENT: Negative for congestion, nosebleeds and sore throat.    Eyes: Negative for visual disturbance.   Respiratory: Negative for cough, chest tightness and shortness of breath.    Cardiovascular: Negative for chest pain and palpitations.   Gastrointestinal: Negative for abdominal distention, abdominal pain, blood in stool, constipation, diarrhea, nausea and vomiting.        Denies early satiety   Endocrine: Negative for polyuria.   Genitourinary: Negative for difficulty urinating, dyspareunia, dysuria, frequency, genital sores, hematuria,  "menstrual problem, pelvic pain, urgency, vaginal bleeding, vaginal discharge and vaginal pain.   Musculoskeletal: Negative for arthralgias and joint swelling.   Skin: Negative for color change and rash.   Neurological: Negative for dizziness, light-headedness and headaches.   Hematological: Does not bruise/bleed easily.   Psychiatric/Behavioral: Negative for dysphoric mood. The patient is not nervous/anxious.        OBJECTIVE:   Vitals:    08/09/21 1015   BP: 127/78   Pulse: 69   Weight: 66.1 kg (145 lb 12.8 oz)   Height: 170.2 cm (67\")      Physical Exam   Constitutional: She is oriented to person, place, and time. She appears well-developed and well-nourished. No distress.   HENT:   Head: Normocephalic and atraumatic.   Eyes: No scleral icterus.   Neck: No thyromegaly present.   Cardiovascular: Normal rate and regular rhythm. Exam reveals no gallop and no friction rub.   No murmur heard.  Pulmonary/Chest: Effort normal and breath sounds normal. No respiratory distress. She has no wheezes. She has no rales. She exhibits no tenderness. Right breast exhibits no inverted nipple. Left breast exhibits no inverted nipple. No breast swelling, tenderness, discharge or bleeding. Breasts are symmetrical.   Abdominal: Soft. Bowel sounds are normal. She exhibits no distension. There is no abdominal tenderness. There is no guarding.   Genitourinary: Vagina normal and uterus normal. There is no rash, tenderness, lesion, injury or Bartholin's cyst on the right labia. There is no rash, tenderness, lesion, injury or Bartholin's cyst on the left labia. Uterus is not mobile.   Cervix is not parous. Cervix does not exhibit motion tenderness, discharge, friability, lesion, polyp, nabothian cyst, eversion, pinkness or cyanosis. Right adnexum displays no mass, no tenderness and no fullness. Right adnexum is present.Left adnexum displays no mass, no tenderness and no fullness. Left adnexum is present.No vaginal discharge found. "   Musculoskeletal: No deformity.   Neurological: She is alert and oriented to person, place, and time.   Skin: Skin is warm and dry. She is not diaphoretic.   Psychiatric: Her speech is normal and behavior is normal. Judgment and thought content normal.       ASSESSMENT/PLAN:  Well woman counseling/screening:    Cervical cancer screening:    Reports some h/o cervical dysplasia   The patient is due for a pap in 2025.    Screening guidelines discussed with patient  Breast cancer screening:    Mammogram due Dec 2021   Discussed breast density, did [employer] model and lifetime risk is < 10 %   Breast self awareness encouraged  Colonscopy:   Due in 2024  DXA   2004, recommended calcium and vitamin d, weight bearing exercise  Family history  Reviewed criteria for genetic referral  Healthy lifestyle counseling:   return for routine annual checkups   Would like to continue patches, refill sent. Aware of risks and benefits and specifically reviewed risks related to breast cancer today.      Right sided abdominal pain: resolved

## 2021-10-18 RX ORDER — ESTRADIOL 0.04 MG/D
1 PATCH TRANSDERMAL WEEKLY
Qty: 12 PATCH | Refills: 1 | OUTPATIENT
Start: 2021-10-18

## 2021-10-18 NOTE — TELEPHONE ENCOUNTER
Left message with pharmacy that Estradiol patch was sent in 8/2021 with refillls for 1 year. Patient notified.

## 2021-11-22 RX ORDER — ESTRADIOL 0.04 MG/D
1 PATCH TRANSDERMAL WEEKLY
Qty: 12 PATCH | Refills: 1 | OUTPATIENT
Start: 2021-11-22

## 2021-12-30 ENCOUNTER — PROCEDURE VISIT (OUTPATIENT)
Dept: OBSTETRICS AND GYNECOLOGY | Facility: CLINIC | Age: 56
End: 2021-12-30

## 2021-12-30 ENCOUNTER — APPOINTMENT (OUTPATIENT)
Dept: WOMENS IMAGING | Facility: HOSPITAL | Age: 56
End: 2021-12-30

## 2021-12-30 DIAGNOSIS — Z12.31 VISIT FOR SCREENING MAMMOGRAM: Primary | ICD-10-CM

## 2021-12-30 PROCEDURE — 77063 BREAST TOMOSYNTHESIS BI: CPT | Performed by: RADIOLOGY

## 2021-12-30 PROCEDURE — 77067 SCR MAMMO BI INCL CAD: CPT | Performed by: RADIOLOGY

## 2021-12-30 PROCEDURE — 77063 BREAST TOMOSYNTHESIS BI: CPT | Performed by: OBSTETRICS & GYNECOLOGY

## 2021-12-30 PROCEDURE — 77067 SCR MAMMO BI INCL CAD: CPT | Performed by: OBSTETRICS & GYNECOLOGY

## 2022-01-05 ENCOUNTER — TELEPHONE (OUTPATIENT)
Dept: OBSTETRICS AND GYNECOLOGY | Facility: CLINIC | Age: 57
End: 2022-01-05

## 2022-01-05 NOTE — TELEPHONE ENCOUNTER
General message left for patient to call office at her convenience. She needs to know normal mammo results and she also should receive a letter.

## 2022-01-05 NOTE — TELEPHONE ENCOUNTER
----- Message from Maribel Anand MD sent at 1/4/2022 11:43 PM EST -----  Please make sure patient is aware of benign mammogram imaging.  Thanks!

## 2022-02-14 RX ORDER — VALACYCLOVIR HYDROCHLORIDE 500 MG/1
TABLET, FILM COATED ORAL
Qty: 90 TABLET | Refills: 2 | Status: SHIPPED | OUTPATIENT
Start: 2022-02-14 | End: 2022-12-19

## 2022-09-06 ENCOUNTER — TELEPHONE (OUTPATIENT)
Dept: OBSTETRICS AND GYNECOLOGY | Facility: CLINIC | Age: 57
End: 2022-09-06

## 2022-09-06 RX ORDER — ESTRADIOL 0.04 MG/D
1 PATCH TRANSDERMAL WEEKLY
Qty: 4 PATCH | Refills: 0 | Status: SHIPPED | OUTPATIENT
Start: 2022-09-06 | End: 2023-01-19

## 2022-09-06 NOTE — TELEPHONE ENCOUNTER
Called patient to get her scheduled for her AE so she can get refills on meds. Patient stated she wants a mammogram now because she has a lump under her arm. (She is not due until 12/31/22 for her routine mammo). Advised patient we would need to see her in the next couple of days for her breast lump and she stated she just wanted a mammogram. Discussed with patient that this would require diagnostic imaging and we did not do here. Patient states she just would go ahead and schedule her AE and routine mammogram and would go to see her PCP for lump. Encouraged patient to do so ASAP.

## 2022-10-19 DIAGNOSIS — M79.622 AXILLARY PAIN, LEFT: Primary | ICD-10-CM

## 2022-12-19 RX ORDER — VALACYCLOVIR HYDROCHLORIDE 500 MG/1
TABLET, FILM COATED ORAL
Qty: 90 TABLET | Refills: 2 | Status: SHIPPED | OUTPATIENT
Start: 2022-12-19

## 2023-01-12 RX ORDER — PROGESTERONE 100 MG/1
CAPSULE ORAL
Qty: 90 CAPSULE | Refills: 0 | Status: SHIPPED | OUTPATIENT
Start: 2023-01-12 | End: 2023-01-19 | Stop reason: SDUPTHER

## 2023-01-19 ENCOUNTER — PROCEDURE VISIT (OUTPATIENT)
Dept: OBSTETRICS AND GYNECOLOGY | Facility: CLINIC | Age: 58
End: 2023-01-19
Payer: COMMERCIAL

## 2023-01-19 ENCOUNTER — OFFICE VISIT (OUTPATIENT)
Dept: OBSTETRICS AND GYNECOLOGY | Facility: CLINIC | Age: 58
End: 2023-01-19
Payer: COMMERCIAL

## 2023-01-19 VITALS
WEIGHT: 155 LBS | BODY MASS INDEX: 24.33 KG/M2 | SYSTOLIC BLOOD PRESSURE: 126 MMHG | HEIGHT: 67 IN | DIASTOLIC BLOOD PRESSURE: 80 MMHG

## 2023-01-19 DIAGNOSIS — Z79.890 HORMONE REPLACEMENT THERAPY (HRT): ICD-10-CM

## 2023-01-19 DIAGNOSIS — Z01.419 WOMEN'S ANNUAL ROUTINE GYNECOLOGICAL EXAMINATION: Primary | ICD-10-CM

## 2023-01-19 DIAGNOSIS — Z12.31 VISIT FOR SCREENING MAMMOGRAM: Primary | ICD-10-CM

## 2023-01-19 DIAGNOSIS — Z78.0 POSTMENOPAUSE: ICD-10-CM

## 2023-01-19 PROCEDURE — 77063 BREAST TOMOSYNTHESIS BI: CPT | Performed by: NURSE PRACTITIONER

## 2023-01-19 PROCEDURE — 77067 SCR MAMMO BI INCL CAD: CPT | Performed by: NURSE PRACTITIONER

## 2023-01-19 PROCEDURE — 99396 PREV VISIT EST AGE 40-64: CPT | Performed by: NURSE PRACTITIONER

## 2023-01-19 RX ORDER — PROGESTERONE 100 MG/1
100 CAPSULE ORAL DAILY
Qty: 90 CAPSULE | Refills: 3 | Status: SHIPPED | OUTPATIENT
Start: 2023-01-19

## 2023-01-19 RX ORDER — ESTRADIOL 0.04 MG/D
1 FILM, EXTENDED RELEASE TRANSDERMAL 2 TIMES WEEKLY
Qty: 24 PATCH | Refills: 3 | Status: SHIPPED | OUTPATIENT
Start: 2023-01-19 | End: 2024-01-19

## 2023-01-19 NOTE — PROGRESS NOTES
GYN Annual Exam     Chief Complaint   Patient presents with   • Gynecologic Exam     Patient is here for a annual.       Aneta Bhat is a 57 y.o. female who presents for annual well woman exam. She is postmenopausal. Prior endometrial ablation. She has hot flashes and night sweats. She denies vaginal spotting or discharge. She completes SBE monthly. Hx oral HSV lesions. She does not need a refill of valtrex at this time. Currently using HRT for vasomotor symptoms, c/o patches are coming off in shower around day 4 she would like to try something different. She also c/o size of climara patch. She would like to try a smaller patch if possible.    This is my first time meeting Aneta Bhat  She is a patient of Dr. Anand's.     OB History        2    Para   1    Term   1            AB   1    Living   1       SAB        IAB        Ectopic        Molar        Multiple        Live Births   1                Mammogram: today  Dexa scan: 2021 ordered by Dr Collins   Colonoscopy: 2022, she is unsure when repeat is due  Last Pap : 2020 NIL, HPV negative  History of abnormal Pap smear: yes - denies prior cervical procedures or hx dysplasia  Family history of uterine, colon or ovarian cancer: yes - father-colon cancer, mother uterine cancer, maternal aunt X2 ovarian cancer  Family history of breast cancer: yes - paternal and maternal aunts  History of abnormal mammogram: no    Menopause:  Bleeding since? no  Vasomotor symptoms: yes  HRT: yes  Dyspareunia: rarely       Past Medical History:   Diagnosis Date   • Arthritis    • Low back pain    • Menopause     TAKING VENLAFAXINE   • Neuropathy     FINGERTIPS   • PONV (postoperative nausea and vomiting)     WANTS SCOPOLAMINE PATCH   • Restless leg    • Thyroid cancer (HCC) 2017   • Torn rotator cuff     RIGHT       Past Surgical History:   Procedure Laterality Date   • BLEPHAROPLASTY     • ENDOMETRIAL ABLATION     • SHOULDER ARTHROSCOPY W/ ROTATOR CUFF  REPAIR Right 11/17/2016    Procedure: RT SHOULDER ARTHROSCOPY WITH ACROMIOPLASTY AND ROTATOR CUFF REPAIR;  Surgeon: Mor Terry MD;  Location: [facility] OR [facility] [place];  Service:    • THYROIDECTOMY  2017         Current Outpatient Medications:   •  Progesterone (PROMETRIUM) 100 MG capsule, Take 1 capsule by mouth Daily., Disp: 90 capsule, Rfl: 3  •  SYNTHROID 125 MCG tablet, TAKE 1 TABLET BY MOUTH EVERY DAY, Disp: , Rfl: 2  •  valACYclovir (VALTREX) 500 MG tablet, TAKE 1 TABLET BY MOUTH EVERY DAY, Disp: 90 tablet, Rfl: 2  •  vitamin B-12 (CYANOCOBALAMIN) 1000 MCG tablet, Take 1,000 mcg by mouth Daily., Disp: , Rfl:   •  estradiol (Vivelle-Dot) 0.0375 MG/24HR patch, Place 1 patch on the skin as directed by provider 2 (Two) Times a Week., Disp: 24 patch, Rfl: 3    Allergies   Allergen Reactions   • Penicillins Hives, Swelling, Anaphylaxis and Shortness Of Breath   • Penicillin G Rash       Social History     Tobacco Use   • Smoking status: Never   • Smokeless tobacco: Never   Substance Use Topics   • Alcohol use: Yes     Comment: social   • Drug use: No       Family History   Problem Relation Age of Onset   • Uterine cancer Mother         70s   • Anemia Mother    • Hypertension Mother    • COPD Mother    • Depression Mother    • Osteoporosis Mother    • Colon cancer Father         80s   • Hypertension Father    • Osteoporosis Father    • Breast cancer Maternal Aunt         70s   • Breast cancer Paternal Aunt         40s   • Ovarian cancer Maternal Aunt         50s   • Deep vein thrombosis Neg Hx    • Pulmonary embolism Neg Hx        Review of Systems   Constitutional: Negative for chills, fatigue and fever.   Gastrointestinal: Negative for abdominal distention, abdominal pain, nausea and vomiting.   Endocrine: Positive for heat intolerance. Negative for cold intolerance.   Genitourinary: Negative for dyspareunia, dysuria, menstrual problem, pelvic pain, vaginal bleeding, vaginal discharge and vaginal pain.   Musculoskeletal:  "Negative for gait problem.   Skin: Negative for rash.   Neurological: Negative for dizziness and headaches.   Hematological: Does not bruise/bleed easily.   Psychiatric/Behavioral: Negative for behavioral problems.       /80   Ht 170.2 cm (67\")   Wt 70.3 kg (155 lb)   BMI 24.28 kg/m²     Physical Exam  Constitutional:       Appearance: Normal appearance.   Genitourinary:      Vulva, bladder and urethral meatus normal.      No lesions in the vagina.      Right Labia: No rash, tenderness, lesions, skin changes or Bartholin's cyst.     Left Labia: No tenderness, lesions, skin changes, Bartholin's cyst or rash.     No labial fusion noted.      No inguinal adenopathy present in the right or left side.     No vaginal discharge, erythema, tenderness, bleeding or ulceration.      No vaginal prolapse present.     No vaginal atrophy present.       Right Adnexa: not tender, not full, not palpable, no mass present and not absent.     Left Adnexa: not tender, not full, not palpable, no mass present and not absent.     No cervical motion tenderness, discharge, friability, lesion, polyp, nabothian cyst or eversion.      Uterus is not enlarged, fixed, tender, irregular or prolapsed.      No uterine mass detected.     No urethral tenderness or mass present.      Pelvic exam was performed with patient in the lithotomy position.   Breasts:     Breasts are symmetrical.      Right: Present. No swelling, bleeding, inverted nipple, mass, nipple discharge, skin change, tenderness or breast implant.      Left: Present. No swelling, bleeding, inverted nipple, mass, nipple discharge, skin change, tenderness or breast implant.   HENT:      Head: Normocephalic and atraumatic.   Eyes:      Pupils: Pupils are equal, round, and reactive to light.   Cardiovascular:      Rate and Rhythm: Normal rate.   Pulmonary:      Effort: Pulmonary effort is normal.   Abdominal:      General: There is no distension.      Palpations: Abdomen is soft. " There is no mass.      Tenderness: There is no abdominal tenderness. There is no guarding.      Hernia: No hernia is present. There is no hernia in the left inguinal area or right inguinal area.   Musculoskeletal:         General: Normal range of motion.      Cervical back: Normal range of motion and neck supple. No tenderness.   Lymphadenopathy:      Cervical: No cervical adenopathy.      Upper Body:      Right upper body: No supraclavicular, axillary or pectoral adenopathy.      Left upper body: No supraclavicular, axillary or pectoral adenopathy.      Lower Body: No right inguinal adenopathy. No left inguinal adenopathy.   Neurological:      General: No focal deficit present.      Mental Status: She is alert and oriented to person, place, and time.      Cranial Nerves: No cranial nerve deficit.   Skin:     General: Skin is warm and dry.   Psychiatric:         Mood and Affect: Mood normal.         Behavior: Behavior normal.         Thought Content: Thought content normal.         Judgment: Judgment normal.   Vitals and nursing note reviewed.         Assessment    Diagnoses and all orders for this visit:    1. Women's annual routine gynecological examination (Primary)  -     IGP, Apt HPV,rfx 16 / 18,45    2. Postmenopause    3. Hormone replacement therapy (HRT)  -     estradiol (Vivelle-Dot) 0.0375 MG/24HR patch; Place 1 patch on the skin as directed by provider 2 (Two) Times a Week.  Dispense: 24 patch; Refill: 3  -     Progesterone (PROMETRIUM) 100 MG capsule; Take 1 capsule by mouth Daily.  Dispense: 90 capsule; Refill: 3         Plan     1) Breast Health - Clinical breast exam & mammogram yearly, Self breast awareness. Schedule screening mammogram.   2) Pap - collected  3) STD-no  4) Smoking status- nonsmoker  5) Colon health - screening colonoscopy recommended if not up to date  6) BMI is within normal parameters. No other follow-up for BMI required.  7) Bone health - Weight bearing exercise, dietary calcium  recommendations and vitamin D  reviewed.   8) Encouraged 150 minutes of exercise per week if not medically contraindicated  9) HRT refilled. Will switch to ludmila vickers. Stressed importance of taking progesterone in combination with estrogen. Advised to call with any PMB     Follow up prn and one year    Leila Recinos, APRN  1/19/2023  19:16 EST

## 2023-12-17 DIAGNOSIS — Z79.890 HORMONE REPLACEMENT THERAPY (HRT): ICD-10-CM

## 2023-12-18 DIAGNOSIS — Z79.890 HORMONE REPLACEMENT THERAPY (HRT): ICD-10-CM

## 2023-12-18 RX ORDER — PROGESTERONE 100 MG/1
100 CAPSULE ORAL DAILY
Qty: 90 CAPSULE | Refills: 3 | Status: SHIPPED | OUTPATIENT
Start: 2023-12-18

## 2023-12-18 RX ORDER — ESTRADIOL 0.04 MG/D
1 FILM, EXTENDED RELEASE TRANSDERMAL 2 TIMES WEEKLY
Qty: 24 PATCH | Refills: 3 | Status: SHIPPED | OUTPATIENT
Start: 2023-12-18 | End: 2024-12-17

## 2024-01-25 ENCOUNTER — OFFICE VISIT (OUTPATIENT)
Dept: OBSTETRICS AND GYNECOLOGY | Facility: CLINIC | Age: 59
End: 2024-01-25
Payer: COMMERCIAL

## 2024-01-25 ENCOUNTER — PROCEDURE VISIT (OUTPATIENT)
Dept: OBSTETRICS AND GYNECOLOGY | Facility: CLINIC | Age: 59
End: 2024-01-25
Payer: COMMERCIAL

## 2024-01-25 VITALS — DIASTOLIC BLOOD PRESSURE: 79 MMHG | SYSTOLIC BLOOD PRESSURE: 133 MMHG | BODY MASS INDEX: 23.84 KG/M2 | WEIGHT: 152.2 LBS

## 2024-01-25 DIAGNOSIS — Z01.419 WOMEN'S ANNUAL ROUTINE GYNECOLOGICAL EXAMINATION: Primary | ICD-10-CM

## 2024-01-25 DIAGNOSIS — Z80.49 FAMILY HISTORY OF UTERINE CANCER: ICD-10-CM

## 2024-01-25 DIAGNOSIS — Z78.0 POSTMENOPAUSE: ICD-10-CM

## 2024-01-25 DIAGNOSIS — Z12.31 VISIT FOR SCREENING MAMMOGRAM: Primary | ICD-10-CM

## 2024-01-25 DIAGNOSIS — Z80.3 FAMILY HISTORY OF BREAST CANCER: ICD-10-CM

## 2024-01-25 DIAGNOSIS — Z79.890 HORMONE REPLACEMENT THERAPY (HRT): ICD-10-CM

## 2024-01-25 DIAGNOSIS — Z85.43 HISTORY OF OVARIAN CANCER: ICD-10-CM

## 2024-01-25 RX ORDER — ESTRADIOL 0.04 MG/D
1 FILM, EXTENDED RELEASE TRANSDERMAL 2 TIMES WEEKLY
Qty: 24 PATCH | Refills: 3 | Status: SHIPPED | OUTPATIENT
Start: 2024-01-25 | End: 2025-01-24

## 2024-01-25 RX ORDER — PROGESTERONE 100 MG/1
100 CAPSULE ORAL DAILY
Qty: 90 CAPSULE | Refills: 3 | Status: SHIPPED | OUTPATIENT
Start: 2024-01-25

## 2024-01-25 NOTE — PROGRESS NOTES
GYN Annual Exam     Chief Complaint   Patient presents with    Gynecologic Exam     Patient is here today for an annual exam. Last pap 2023- neg, mammo today.        Aneta Bhat is a 58 y.o. female who presents for annual well woman exam. She is postmenopausal. She denies vaginal spotting or discharge. She completes SBE monthly. On HRT with good control of VMS. Family hx breast cancer, she would like BRCA testing today.     She is a patient of Dr. Anand's.     OB History          2    Para   1    Term   1            AB   1    Living   1         SAB        IAB        Ectopic        Molar        Multiple        Live Births   1              Mammogram: today  Dexa scan:  osteopenia, managed by Dr Collins  Colonoscopy:   Last Pap :  NIL, HPV negative   History of abnormal Pap smear: yes - denies prior cervical procedures or hx dysplasia  Family history of uterine, colon or ovarian cancer: yes - father-colon cancer, mother uterine cancer, maternal aunt X2 ovarian cancer  Family history of breast cancer: yes - paternal and maternal aunts  History of abnormal mammogram: no    Menopause:  Bleeding since? no  Vasomotor symptoms: no  HRT: yes  Dyspareunia: yes, dryness    Past Medical History:   Diagnosis Date    Arthritis     Low back pain     Menopause     TAKING VENLAFAXINE    Neuropathy     FINGERTIPS    PONV (postoperative nausea and vomiting)     WANTS SCOPOLAMINE PATCH    Restless leg     Thyroid cancer 2017    Torn rotator cuff     RIGHT       Past Surgical History:   Procedure Laterality Date    BLEPHAROPLASTY      ENDOMETRIAL ABLATION      SHOULDER ARTHROSCOPY W/ ROTATOR CUFF REPAIR Right 2016    Procedure: RT SHOULDER ARTHROSCOPY WITH ACROMIOPLASTY AND ROTATOR CUFF REPAIR;  Surgeon: Mor Terry MD;  Location: [facility];  Service:     THYROIDECTOMY  2017         Current Outpatient Medications:     estradiol (VIVELLE-DOT) 0.0375 MG/24HR patch, Place 1 patch on the  skin as directed by provider 2 (Two) Times a Week., Disp: 24 patch, Rfl: 3    Progesterone (PROMETRIUM) 100 MG capsule, Take 1 capsule by mouth Daily., Disp: 90 capsule, Rfl: 3    SYNTHROID 125 MCG tablet, TAKE 1 TABLET BY MOUTH EVERY DAY, Disp: , Rfl: 2    valACYclovir (VALTREX) 500 MG tablet, TAKE 1 TABLET BY MOUTH EVERY DAY, Disp: 90 tablet, Rfl: 2    vitamin B-12 (CYANOCOBALAMIN) 1000 MCG tablet, Take 1 tablet by mouth Daily., Disp: , Rfl:     Allergies   Allergen Reactions    Penicillins Hives, Swelling, Anaphylaxis and Shortness Of Breath    Penicillin G Rash       Social History     Tobacco Use    Smoking status: Never    Smokeless tobacco: Never   Vaping Use    Vaping Use: Never used   Substance Use Topics    Alcohol use: Yes     Comment: social    Drug use: No       Family History   Problem Relation Age of Onset    Uterine cancer Mother         70s    Anemia Mother     Hypertension Mother     COPD Mother     Depression Mother     Osteoporosis Mother     Colon cancer Father         80s    Hypertension Father     Osteoporosis Father     Breast cancer Maternal Aunt         70s    Breast cancer Paternal Aunt         40s    Ovarian cancer Maternal Aunt         50s    Deep vein thrombosis Neg Hx     Pulmonary embolism Neg Hx        Review of Systems   Constitutional:  Negative for chills, fatigue and fever.   Gastrointestinal:  Negative for abdominal distention, abdominal pain, nausea and vomiting.   Endocrine: Negative for cold intolerance and heat intolerance.   Genitourinary:  Negative for dyspareunia, dysuria, menstrual problem, pelvic pain, vaginal bleeding, vaginal discharge and vaginal pain.   Musculoskeletal:  Negative for gait problem.   Skin:  Negative for rash.   Neurological:  Negative for dizziness and headaches.   Hematological:  Does not bruise/bleed easily.   Psychiatric/Behavioral:  Negative for behavioral problems.        /79   Wt 69 kg (152 lb 3.2 oz)   BMI 23.84 kg/m²     Physical  Exam  Constitutional:       General: She is not in acute distress.     Appearance: Normal appearance. She is not ill-appearing, toxic-appearing or diaphoretic.   Genitourinary:      Vulva, bladder and urethral meatus normal.      No lesions in the vagina.      Right Labia: No rash, tenderness, lesions, skin changes or Bartholin's cyst.     Left Labia: No tenderness, lesions, skin changes, Bartholin's cyst or rash.     No labial fusion noted.      No inguinal adenopathy present in the right or left side.     No vaginal discharge, erythema, tenderness, bleeding or ulceration.      No vaginal prolapse present.     No vaginal atrophy present.       Right Adnexa: not tender, not full, not palpable, no mass present and not absent.     Left Adnexa: not tender, not full, not palpable, no mass present and not absent.     No cervical motion tenderness, discharge, friability, lesion, polyp, nabothian cyst or eversion.      Uterus is not enlarged, fixed, tender, irregular or prolapsed.      No uterine mass detected.     No urethral tenderness or mass present.      Pelvic exam was performed with patient in the lithotomy position.   Breasts:     Breasts are symmetrical.      Right: Present. No swelling, bleeding, inverted nipple, mass, nipple discharge, skin change, tenderness or breast implant.      Left: Present. No swelling, bleeding, inverted nipple, mass, nipple discharge, skin change, tenderness or breast implant.   HENT:      Head: Normocephalic and atraumatic.   Eyes:      Pupils: Pupils are equal, round, and reactive to light.   Cardiovascular:      Rate and Rhythm: Normal rate.   Pulmonary:      Effort: Pulmonary effort is normal.   Abdominal:      General: There is no distension.      Palpations: Abdomen is soft. There is no mass.      Tenderness: There is no abdominal tenderness. There is no guarding.      Hernia: No hernia is present. There is no hernia in the left inguinal area or right inguinal area.    Musculoskeletal:         General: Normal range of motion.      Cervical back: Normal range of motion and neck supple. No tenderness.   Lymphadenopathy:      Cervical: No cervical adenopathy.      Upper Body:      Right upper body: No supraclavicular, axillary or pectoral adenopathy.      Left upper body: No supraclavicular, axillary or pectoral adenopathy.      Lower Body: No right inguinal adenopathy. No left inguinal adenopathy.   Neurological:      General: No focal deficit present.      Mental Status: She is alert and oriented to person, place, and time.      Cranial Nerves: No cranial nerve deficit.   Skin:     General: Skin is warm and dry.   Psychiatric:         Mood and Affect: Mood normal.         Behavior: Behavior normal.         Thought Content: Thought content normal.         Judgment: Judgment normal.   Vitals and nursing note reviewed.       Assessment    Diagnoses and all orders for this visit:    1. Women's annual routine gynecological examination (Primary)    2. Postmenopause    3. Hormone replacement therapy (HRT)  -     estradiol (VIVELLE-DOT) 0.0375 MG/24HR patch; Place 1 patch on the skin as directed by provider 2 (Two) Times a Week.  Dispense: 24 patch; Refill: 3  -     Progesterone (PROMETRIUM) 100 MG capsule; Take 1 capsule by mouth Daily.  Dispense: 90 capsule; Refill: 3    4. Family history of breast cancer  -     Cancel: BRCAAssure Comprehensive Test; Future  -     BRCAAssure Comprehensive Test    5. History of ovarian cancer  -     Cancel: BRCAAssure Comprehensive Test; Future  -     BRCAAssure Comprehensive Test    6. Family history of uterine cancer  -     Cancel: BRCAAssure Comprehensive Test; Future  -     BRCAAssure Comprehensive Test       Plan     1) Breast Health - Clinical breast exam & mammogram yearly, Self breast awareness. Schedule screening mammogram.   2) Pap - up to date  3) STD-no  4) Smoking status- nonsmoker  5) Colon health - screening colonoscopy recommended if not  up to date  6) BMI is within normal parameters. No other follow-up for BMI required.  7) Bone health - Weight bearing exercise, dietary calcium recommendations and vitamin D  reviewed.   8) Encouraged 150 minutes of exercise per week if not medically contraindicated  9) HRT: Vivelle dot and progesterone refilled. She is aware she should take progesterone for endometrial protection. Reviewed risks with prolonged use. She verbalizes understanding.  10) BRCA ordered    Return in about 1 year (around 1/25/2025) for Annual physical.    Leila Recinos, APRN  1/25/2024  15:19 EST

## 2024-07-16 RX ORDER — VALACYCLOVIR HYDROCHLORIDE 500 MG/1
TABLET, FILM COATED ORAL
Qty: 90 TABLET | Refills: 2 | Status: SHIPPED | OUTPATIENT
Start: 2024-07-16

## 2025-01-30 ENCOUNTER — OFFICE VISIT (OUTPATIENT)
Dept: OBSTETRICS AND GYNECOLOGY | Facility: CLINIC | Age: 60
End: 2025-01-30
Payer: COMMERCIAL

## 2025-01-30 ENCOUNTER — PROCEDURE VISIT (OUTPATIENT)
Dept: OBSTETRICS AND GYNECOLOGY | Facility: CLINIC | Age: 60
End: 2025-01-30
Payer: COMMERCIAL

## 2025-01-30 VITALS
HEIGHT: 67 IN | HEART RATE: 69 BPM | WEIGHT: 150 LBS | SYSTOLIC BLOOD PRESSURE: 121 MMHG | DIASTOLIC BLOOD PRESSURE: 80 MMHG | BODY MASS INDEX: 23.54 KG/M2

## 2025-01-30 DIAGNOSIS — Z79.890 HORMONE REPLACEMENT THERAPY (HRT): ICD-10-CM

## 2025-01-30 DIAGNOSIS — Z12.31 VISIT FOR SCREENING MAMMOGRAM: Primary | ICD-10-CM

## 2025-01-30 DIAGNOSIS — Z78.0 POSTMENOPAUSE: ICD-10-CM

## 2025-01-30 DIAGNOSIS — Z01.419 WOMEN'S ANNUAL ROUTINE GYNECOLOGICAL EXAMINATION: Primary | ICD-10-CM

## 2025-01-30 RX ORDER — PROGESTERONE 100 MG/1
100 CAPSULE ORAL DAILY
Qty: 90 CAPSULE | Refills: 3 | Status: SHIPPED | OUTPATIENT
Start: 2025-01-30

## 2025-01-30 RX ORDER — ESTRADIOL 0.04 MG/D
1 PATCH, EXTENDED RELEASE TRANSDERMAL 2 TIMES WEEKLY
Qty: 24 PATCH | Refills: 3 | Status: SHIPPED | OUTPATIENT
Start: 2025-01-30 | End: 2026-01-30

## 2025-01-30 NOTE — PROGRESS NOTES
GYN Annual Exam     Chief Complaint   Patient presents with    Women's annual routine gynecological examination     Last pap      Aneta Bhat is a 59 y.o. female who presents for annual well woman exam. She is postmenopausal. She denies vaginal spotting or discharge. She completes SBE monthly. On HRT with good control of VMS.     OB History          2    Para   1    Term   1            AB   1    Living   1         SAB        IAB        Ectopic        Molar        Multiple        Live Births   1              Mammogram: today  Dexa scan:  osteopenia, managed by Dr Collins. Taking calcium and vitamin D  Colonoscopy: she is not sure when repeat is due, states PCP if following.   Last Pap :  NIL, HPV negative  History of abnormal Pap smear: yes - denies prior cervical procedures or hx dysplasia  Family history of uterine, colon or ovarian cancer: yes - father-colon cancer, mother uterine cancer, maternal aunt X2 ovarian cancer  Family history of breast cancer: yes - paternal and maternal aunts  History of abnormal mammogram: no    Menopause:  Bleeding since? no  Vasomotor symptoms: no  HRT: yes  Dyspareunia: yes, dryness    Past Medical History:   Diagnosis Date    Arthritis     Low back pain     Menopause     TAKING VENLAFAXINE    Neuropathy     FINGERTIPS    PONV (postoperative nausea and vomiting)     WANTS SCOPOLAMINE PATCH    Restless leg     Thyroid cancer 2017    Torn rotator cuff     RIGHT       Past Surgical History:   Procedure Laterality Date    BLEPHAROPLASTY      ENDOMETRIAL ABLATION      SHOULDER ARTHROSCOPY W/ ROTATOR CUFF REPAIR Right 2016    Procedure: RT SHOULDER ARTHROSCOPY WITH ACROMIOPLASTY AND ROTATOR CUFF REPAIR;  Surgeon: Mor Terry MD;  Location: [facility] OR [facility] [place];  Service:     THYROIDECTOMY  2017         Current Outpatient Medications:     estradiol (VIVELLE-DOT) 0.0375 MG/24HR patch, Place 1 patch on the skin as directed by provider 2 (Two) Times a  "Week., Disp: 24 patch, Rfl: 3    Progesterone (PROMETRIUM) 100 MG capsule, Take 1 capsule by mouth Daily., Disp: 90 capsule, Rfl: 3    SYNTHROID 125 MCG tablet, TAKE 1 TABLET BY MOUTH EVERY DAY, Disp: , Rfl: 2    valACYclovir (VALTREX) 500 MG tablet, TAKE 1 TABLET BY MOUTH EVERY DAY, Disp: 90 tablet, Rfl: 2    vitamin B-12 (CYANOCOBALAMIN) 1000 MCG tablet, Take 1 tablet by mouth Daily., Disp: , Rfl:     Allergies   Allergen Reactions    Penicillins Hives, Swelling, Anaphylaxis and Shortness Of Breath    Penicillin G Rash       Social History     Tobacco Use    Smoking status: Never    Smokeless tobacco: Never   Vaping Use    Vaping status: Never Used   Substance Use Topics    Alcohol use: Yes     Comment: social    Drug use: No       Family History   Problem Relation Age of Onset    Uterine cancer Mother         70s    Anemia Mother     Hypertension Mother     COPD Mother     Depression Mother     Osteoporosis Mother     Colon cancer Father         80s    Hypertension Father     Osteoporosis Father     Breast cancer Maternal Aunt         70s    Breast cancer Paternal Aunt         40s    Ovarian cancer Maternal Aunt         50s    Deep vein thrombosis Neg Hx     Pulmonary embolism Neg Hx        Review of Systems   Constitutional:  Negative for chills, fatigue and fever.   Gastrointestinal:  Negative for abdominal distention, abdominal pain, nausea and vomiting.   Endocrine: Negative for cold intolerance and heat intolerance.   Genitourinary:  Negative for dyspareunia, dysuria, menstrual problem, pelvic pain, vaginal bleeding, vaginal discharge and vaginal pain.   Musculoskeletal:  Negative for gait problem.   Skin:  Negative for rash.   Neurological:  Negative for dizziness and headaches.   Hematological:  Does not bruise/bleed easily.   Psychiatric/Behavioral:  Negative for behavioral problems.        /80   Pulse 69   Ht 170.2 cm (67.01\")   Wt 68 kg (150 lb)   LMP  (LMP Unknown)   BMI 23.49 kg/m² "     Physical Exam  Constitutional:       General: She is not in acute distress.     Appearance: Normal appearance. She is not ill-appearing, toxic-appearing or diaphoretic.   Genitourinary:      Vulva, bladder and urethral meatus normal.      No lesions in the vagina.      Right Labia: No rash, tenderness, lesions, skin changes or Bartholin's cyst.     Left Labia: No tenderness, lesions, skin changes, Bartholin's cyst or rash.     No labial fusion noted.      No inguinal adenopathy present in the right or left side.     No vaginal discharge, erythema, tenderness, bleeding or ulceration.      No vaginal prolapse present.     No vaginal atrophy present.       Right Adnexa: not tender, not full, not palpable, no mass present and not absent.     Left Adnexa: not tender, not full, not palpable, no mass present and not absent.     No cervical motion tenderness, discharge, friability, lesion, polyp, nabothian cyst or eversion.      Uterus is not enlarged, fixed, tender, irregular or prolapsed.      No uterine mass detected.     No urethral tenderness or mass present.      Pelvic exam was performed with patient in the lithotomy position.   Breasts:     Breasts are symmetrical.      Right: Present. No swelling, bleeding, inverted nipple, mass, nipple discharge, skin change, tenderness or breast implant.      Left: Present. No swelling, bleeding, inverted nipple, mass, nipple discharge, skin change, tenderness or breast implant.   HENT:      Head: Normocephalic and atraumatic.   Eyes:      Pupils: Pupils are equal, round, and reactive to light.   Cardiovascular:      Rate and Rhythm: Normal rate.   Pulmonary:      Effort: Pulmonary effort is normal.   Abdominal:      General: There is no distension.      Palpations: Abdomen is soft. There is no mass.      Tenderness: There is no abdominal tenderness. There is no guarding.      Hernia: No hernia is present. There is no hernia in the left inguinal area or right inguinal area.    Musculoskeletal:         General: Normal range of motion.      Cervical back: Normal range of motion and neck supple. No tenderness.   Lymphadenopathy:      Cervical: No cervical adenopathy.      Upper Body:      Right upper body: No supraclavicular, axillary or pectoral adenopathy.      Left upper body: No supraclavicular, axillary or pectoral adenopathy.      Lower Body: No right inguinal adenopathy. No left inguinal adenopathy.   Neurological:      General: No focal deficit present.      Mental Status: She is alert and oriented to person, place, and time.      Cranial Nerves: No cranial nerve deficit.   Skin:     General: Skin is warm and dry.   Psychiatric:         Mood and Affect: Mood normal.         Behavior: Behavior normal.         Thought Content: Thought content normal.         Judgment: Judgment normal.   Vitals and nursing note reviewed.       Assessment    Diagnoses and all orders for this visit:    1. Women's annual routine gynecological examination (Primary)    2. Postmenopause    3. Hormone replacement therapy (HRT)  -     estradiol (VIVELLE-DOT) 0.0375 MG/24HR patch; Place 1 patch on the skin as directed by provider 2 (Two) Times a Week.  Dispense: 24 patch; Refill: 3  -     Progesterone (PROMETRIUM) 100 MG capsule; Take 1 capsule by mouth Daily.  Dispense: 90 capsule; Refill: 3       Plan     1) Breast Health - Clinical breast exam & mammogram yearly, Self breast awareness. Schedule screening mammogram.   2) Pap - up to date   3) STD-no  4) Smoking status- nonsmoker  5) Colon health - screening colonoscopy recommended if not up to date  6) BMI normal range  7) Bone health - Weight bearing exercise, dietary calcium recommendations and vitamin D  reviewed.   8) Encouraged 150 minutes of exercise per week if not medically contraindicated  9) HRT refilled    Return in about 1 year (around 1/30/2026) for Annual physical, LENI Garcia.    LENI Moser  1/30/2025  09:51 EST

## 2025-05-23 RX ORDER — VALACYCLOVIR HYDROCHLORIDE 500 MG/1
500 TABLET, FILM COATED ORAL DAILY
Qty: 90 TABLET | Refills: 2 | Status: SHIPPED | OUTPATIENT
Start: 2025-05-23